# Patient Record
(demographics unavailable — no encounter records)

---

## 2025-02-03 NOTE — HISTORY OF PRESENT ILLNESS
[de-identified] : 70 year old male presents for surgical consultation for cochlear implant. States he has had bilateral hearing loss for 20+ years. States he has dealt with it until recently he got bilateral hearing aids 2-3 months ago--last audio was 4-5 months ago with [facility].  Has had no issues with hearing aids. Denies otalgia, otorrhea, ear infections, tinnitus, dizziness, headaches. Father has history of hearing loss, but did not wear hearing aids. No recent imaging.

## 2025-02-03 NOTE — DATA REVIEWED
[de-identified] : RE: Hearing WNL through 500 Hz, sloping to a mild to profound SNHL. LE: Mild to profound SNHL. Type A Tymp, Au.

## 2025-06-09 NOTE — REASON FOR VISIT
[Symptom and Test Evaluation] : symptom and test evaluation [Hyperlipidemia] : hyperlipidemia [Hypertension] : hypertension [Coronary Artery Disease] : coronary artery disease [FreeTextEntry1] : S/p CABG X2 in 2018

## 2025-06-09 NOTE — ASSESSMENT
[FreeTextEntry1] : Atherosclerotic heart disease asymptomatic status post CABG will check his lipid levels as well as hemoglobin A1c.  Hyperlipidemia LDL last year was slightly elevated at which point he was switched from atorvastatin to rosuvastatin we will check lipids again to see if there has been a beneficial impact  Status post CABG x 2 currently asymptomatic last stress echo was unremarkable we will continue to monitor.  Shortness of breath on exertion seems to be stable he has gained weight advised to walk more on a regular basis and exercise and to drop some of his weight back  Prediabetes diabetes last hemoglobin A1c was 6.2 we will repeat to assess.  Cardiac status is stable for planned colonoscopy.

## 2025-06-09 NOTE — PHYSICAL EXAM
[Well Developed] : well developed [Normal Conjunctiva] : normal conjunctiva [Well Nourished] : well nourished [Normal Venous Pressure] : normal venous pressure [No Carotid Bruit] : no carotid bruit [Normal S1, S2] : normal S1, S2 [S4] : S4 [Murmur] : murmur [Good Air Entry] : good air entry [Clear Lung Fields] : clear lung fields [Soft] : abdomen soft [Non Tender] : non-tender [Normal Gait] : normal gait [Normal Radial B/L] : normal radial B/L [No Edema] : no edema [Normal PT B/L] : normal PT B/L [Edema ___] : edema [unfilled] [No Rash] : no rash [Moves all extremities] : moves all extremities [Alert and Oriented] : alert and oriented [No Focal Deficits] : no focal deficits [Normal memory] : normal memory [de-identified] : ESM

## 2025-06-09 NOTE — HISTORY OF PRESENT ILLNESS
[FreeTextEntry1] : 71-year-old male who has follow-up for cardiovascular disease normal coronary artery disease status post CABG 2018 ambulating claims that he gets a little short of breath on moderate to severe exercise no chest pain no palpitations no syncope.  He has no claudication.  Patient is also here for clearance for colonoscopy.

## 2025-06-09 NOTE — CARDIOLOGY SUMMARY
[de-identified] : EKG reviewed by me done today on 6/9/2025 revealed normal sinus rhythm 75 bpm left anterior hemiblock P wave abnormality. [de-identified] : 12/9/2024 CONCLUSIONS 1. Normal exercise stress echocardiogram. 2. Baseline left ventricular cavity size was normal. Immediate post-stress, the left ventricular cavity size  was decreased in size. 3. Arrythmias: Frequent VPD's occured during stress, increased with stress. 4. At rest there were no left ventricular regional wall motion abnormalities. Immediate post-stress there  were no regional wall motion abnormalities seen. 5. No prior echocardiogram is available for comparison [de-identified] : 8/24/2024 CONCLUSIONS: 1. Left ventricular systolic function is normal with an ejection fraction of 62 % by Bates's method of  disks. 2. Normal left ventricular diastolic function. 3. Normal right ventricular cavity size and normal right ventricular systolic function. 4. Aortic root at the sinuses of Valsalva is normal in size, measuring 3.40 cm (indexed 1.80 cm/m).  Ascending aorta is normal in size, measuring 2.90 cm (indexed 1.53 cm/m). Aortic arch diameter is  normal in size, measuring 2.2 cm (indexed 1.14 cm/m). 5. Left atrium is mildly dilated. 6. The right atrium is normal in size. 7. Trileaflet aortic valve with normal systolic excursion. There is mild thickening of the aortic valve leaflets. 8. Mild mitral valve leaflet calcification. 9. There is mild calcification of the mitral valve annulus. 10. Mild to moderate mitral regurgitation. 11. Structurally normal pulmonic valve with normal leaflet excursion. 12. Mild pulmonic regurgitation. 13. Structurally normal tricuspid valve with normal leaflet excursion. Mild to moderate tricuspid  regurgitation. 14. The interatrial septum appears intact. 15. No pericardial effusion seen. 16. No prior echocardiogram is available for comparison.